# Patient Record
Sex: MALE | Employment: UNEMPLOYED | URBAN - METROPOLITAN AREA
[De-identification: names, ages, dates, MRNs, and addresses within clinical notes are randomized per-mention and may not be internally consistent; named-entity substitution may affect disease eponyms.]

---

## 2020-05-02 ENCOUNTER — OFFICE VISIT (OUTPATIENT)
Dept: URGENT CARE | Facility: CLINIC | Age: 30
End: 2020-05-02

## 2020-05-02 VITALS
WEIGHT: 200 LBS | BODY MASS INDEX: 30.31 KG/M2 | HEIGHT: 68 IN | OXYGEN SATURATION: 97 % | HEART RATE: 94 BPM | TEMPERATURE: 99 F

## 2020-05-02 DIAGNOSIS — H57.12 PAIN OF LEFT EYE: Primary | ICD-10-CM

## 2020-05-02 PROCEDURE — 99203 OFFICE O/P NEW LOW 30 MIN: CPT | Mod: TIER,S$GLB,, | Performed by: PHYSICIAN ASSISTANT

## 2020-05-02 PROCEDURE — 99203 PR OFFICE/OUTPT VISIT, NEW, LEVL III, 30-44 MIN: ICD-10-PCS | Mod: TIER,S$GLB,, | Performed by: PHYSICIAN ASSISTANT

## 2020-05-02 RX ORDER — GENTAMICIN SULFATE 3 MG/ML
2 SOLUTION/ DROPS OPHTHALMIC EVERY 4 HOURS
Qty: 15 ML | Refills: 0 | Status: SHIPPED | OUTPATIENT
Start: 2020-05-02 | End: 2020-05-09

## 2020-05-02 NOTE — PATIENT INSTRUCTIONS
· Follow up with your primary care if symptoms do not improve, or you may return here at any time.  · If you were referred to a specialist, please follow up with that specialty.  · If you were prescribed antibiotics, please take them to completion.  · If you were prescribed a narcotic or any medication with sedative effects, do not drive or operate heavy equipment or machinery while taking these medications.  · You must understand that you have received treatment at an Urgent Care facility only, and that you may be released before all of your medical problems are known or treated. Urgent Care facilities are not equipped to handle life threatening emergencies. It is recommended that you seek care at an Emergency Department for further evaluation of worsening or concerning symptoms, or possibly life threatening conditions as discussed.                                        If you  smoke, please stop smoking               PLEASE PRACTICE SOCIAL DISTANCING            Lesión de la córnea    La córnea es la capa transparente en la parte frontal del xavi. Protege el xavi contra el polvo y los gérmenes, y ayuda a filtrar los titi ultravioletas (UV) dañinos. La córnea también ayuda a enfocar la jenny que entra a valles xavi. Aunque la córnea está hecha de proteínas wayne, puede lastimarse. Un gena superficial o leigh raspadura (abrasión) suelen ser menores, isac igualmente pueden ser muy dolorosos. Isac leigh abrasión natasha o leigh punción en la córnea pueden ser cosas muy graves y constituyen emergencias médicas.  Algo en el xavi  Si azeem que tiene algo pequeño en el xavi, enjuáguelo con agua de inmediato. Levante el párpado superior y páselo sobre el párpado inferior para ayudar a incrementar el flujo de lágrimas en el xavi. Si estos métodos no funcionan, llame a valles médico. Nunca intente quitarse un objeto de un xavi si no ha salido fácilmente al enjuagarse el xavi con agua, ya que al hacerlo puede causar más daño.  ¿Cuándo debe acudir  a la jennifer de emergencias (ER, por ernesto siglas en inglés)?  Llame al 911 o al número local de emergencias si tiene:  · Dolor natasha en el xavi  · Leigh lesión provocada por punción o leigh abrasión natasha  · Leigh partícula en el xavi que no sale al enjuagar con agua  · El xavi muy hinchado o adolorido después de gregorio quitado un objeto  · Leigh quemadura química  · Un objeto que se introdujo en valles xavi (Cúbrase ambos ojos con leigh compresa estéril y mantenga ambos ojos cerrados mientras espera que llegue la ayuda. NO EJERZA ningún tipo de presión sobre los ojos).  ¿Qué puede esperar en la ER?  En el roberto carlos de las abrasiones menores   Las abrasiones menores suelen tratarse con gotas o pomada para los ojos. Es posible que le receten antibióticos para evitar leigh infección. La mayoría de las abrasiones sanan en balta o dos días. Para ayudar a descartar lesiones más graves, es posible que le realicen balta o más de los siguientes exámenes:  · Un examen estándar de los ojos para fredy cómo está valles visión.  · Un examen con lámpara de hendidura, que usa un dayanna de jenny y un microscopio para fredy el xavi en detalle.  · Leigh prueba de Bal, que usa leigh tintura especial para fredy si hay daños graves en el xavi.  Según cuáles yasmin los resultados de estas pruebas, puede que le remitan a un especialista de los ojos (oftalmólogo).  En el roberto carlos de abrasiones o punciones graves   Le remitirán directamente a un oftalmólogo para tratamiento de emergencia. Se necesita un especialista de ojos para minimizar todo daño adicional a los ojos y cualquier posible pérdida de visión.  Date Last Reviewed: 8/12/2015  © 2170-9604 [employer]. [address] [place] [address], [place] [address]. Todos los derechos reservados. Esta información no pretende sustituir la atención médica profesional. Sólo valles médico puede diagnosticar y tratar un problema de jared.

## 2020-05-02 NOTE — PROGRESS NOTES
"Subjective:       Patient ID: Abhilash Juárez is a 30 y.o. male.    Vitals:  height is 5' 8" (1.727 m) and weight is 90.7 kg (200 lb). His oral temperature is 99.2 °F (37.3 °C). His pulse is 94. His oxygen saturation is 97%.     Chief Complaint: Eye Problem    Eye Problem    The left eye is affected. This is a new problem. The current episode started yesterday. The problem occurs intermittently. The problem has been unchanged. The injury mechanism was a foreign body. The pain is at a severity of 5/10. The pain is mild. There is no known exposure to pink eye. He does not wear contacts. Associated symptoms include eye redness and itching. Pertinent negatives include no blurred vision, eye discharge, double vision, fever, nausea, photophobia or vomiting. 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 Pt. States he has a burning feeling in his left eye.. He has tried commercial eye wash for the symptoms. The treatment provided no relief.       Constitution: Negative for chills and fever.   HENT: Negative for congestion and sinus pain.    Eyes: Positive for foreign body in eye, eye itching, eye pain and eye redness. Negative for eye trauma, eye discharge, photophobia, vision loss, double vision, blurred vision and eyelid swelling.   Gastrointestinal: Negative for nausea and vomiting.   Skin: Negative for rash.   Allergic/Immunologic: Negative for seasonal allergies and itching.   Neurological: Negative for headaches.       Objective:      Physical Exam   Constitutional: He is oriented to person, place, and time. He appears well-developed and well-nourished.   HENT:   Head: Normocephalic and atraumatic.   Right Ear: External ear normal.   Left Ear: External ear normal.   Nose: Nose normal.   Mouth/Throat: Oropharynx is clear and moist.   Eyes: Pupils are equal, round, and reactive to light. EOM and lids are normal. Left eye exhibits no discharge. " Left conjunctiva is injected. Left conjunctiva has no hemorrhage.   Visual acuity 20/20 in both eyes and each eye individually    No pupillary defect on penlight exam.    Verbal consent obtained prior to procedure.  Proparacaine applied to L eye.  No foreign body with lid sweep.  Fluorescein staining revealed no corneal abrasion or injury.  No evidence of penetrating injury.   No rust ring.  Pt tolerated procedure well.     Neck: Trachea normal, full passive range of motion without pain and phonation normal. Neck supple.   Musculoskeletal: Normal range of motion.   Neurological: He is alert and oriented to person, place, and time.   Skin: Skin is warm, dry and intact.   Psychiatric: He has a normal mood and affect. His speech is normal and behavior is normal. Judgment and thought content normal. Cognition and memory are normal.   Nursing note and vitals reviewed.        Assessment:       1. Pain of left eye        Plan:       All hx was provided by the pt or available as part of established EMR. The pt past medical hx, family hx, social hx, and current medications were reviewed. Pt to follow up with [facility] if no improvement or for any concern, and seek treatment in an ER for worsening. Tx options discussed. Pt voiced understanding of all discussed and agreed with decision making.    Pain of left eye  -     gentamicin (GARAMYCIN) 0.3 % ophthalmic solution; Place 2 drops into both eyes every 4 (four) hours. for 7 days  Dispense: 15 mL; Refill: 0      Patient Instructions   · Follow up with your primary care if symptoms do not improve, or you may return here at any time.  · If you were referred to a specialist, please follow up with that specialty.  · If you were prescribed antibiotics, please take them to completion.  · If you were prescribed a narcotic or any medication with sedative effects, do not drive or operate heavy equipment or machinery while taking these medications.  · You must understand that you have  received treatment at an Urgent Care facility only, and that you may be released before all of your medical problems are known or treated. Urgent Care facilities are not equipped to handle life threatening emergencies. It is recommended that you seek care at an Emergency Department for further evaluation of worsening or concerning symptoms, or possibly life threatening conditions as discussed.                                        If you  smoke, please stop smoking               PLEASE PRACTICE SOCIAL DISTANCING            Lesión de la córnea    La córnea es la capa transparente en la parte frontal del xavi. Protege el xavi contra el polvo y los gérmenes, y ayuda a filtrar los titi ultravioletas (UV) dañinos. La córnea también ayuda a enfocar la jenny que entra a valles xavi. Aunque la córnea está hecha de proteínas wayne, puede lastimarse. Un gena superficial o leigh raspadura (abrasión) suelen ser menores, isac igualmente pueden ser muy dolorosos. Isac leigh abrasión natasha o leigh punción en la córnea pueden ser cosas muy graves y constituyen emergencias médicas.  Algo en el xavi  Si azeem que tiene algo pequeño en el xavi, enjuáguelo con agua de inmediato. Levante el párpado superior y páselo sobre el párpado inferior para ayudar a incrementar el flujo de lágrimas en el xavi. Si estos métodos no funcionan, llame a valles médico. Nunca intente quitarse un objeto de un xavi si no ha salido fácilmente al enjuagarse el xavi con agua, ya que al hacerlo puede causar más daño.  ¿Cuándo debe acudir a la jennifer de emergencias (ER, por ernesto siglas en inglés)?  Llame al 911 o al número local de emergencias si tiene:  · Dolor natasha en el xavi  · Leigh lesión provocada por punción o leigh abrasión natasha  · Leigh partícula en el xavi que no sale al enjuagar con agua  · El xavi muy hinchado o adolorido después de gregorio quitado un objeto  · Leigh quemadura química  · Un objeto que se introdujo en valles xavi (Cúbrase ambos ojos con leigh compresa estéril y  mantenga ambos ojos cerrados mientras espera que llegue la ayuda. NO EJERZA ningún tipo de presión sobre los ojos).  ¿Qué puede esperar en la ER?  En el roberto carlos de las abrasiones menores   Las abrasiones menores suelen tratarse con gotas o pomada para los ojos. Es posible que le receten antibióticos para evitar janet infección. La mayoría de las abrasiones sanan en balta o dos días. Para ayudar a descartar lesiones más graves, es posible que le realicen balta o más de los siguientes exámenes:  · Un examen estándar de los ojos para fredy cómo está valles visión.  · Un examen con lámpara de hendidura, que usa un dayanna de jenny y un microscopio para fredy el xavi en detalle.  · Janet prueba de Bal, que usa janet tintura especial para fredy si hay daños graves en el xavi.  Según cuáles yasmin los resultados de estas pruebas, puede que le remitan a un especialista de los ojos (oftalmólogo).  En el roberto carlos de abrasiones o punciones graves   Le remitirán directamente a un oftalmólogo para tratamiento de emergencia. Se necesita un especialista de ojos para minimizar todo daño adicional a los ojos y cualquier posible pérdida de visión.  Date Last Reviewed: 8/12/2015  © 2300-0683 [employer]. [address] [place] [address], [place] [address]. Todos los derechos reservados. Esta información no pretende sustituir la atención médica profesional. Sólo vlales médico puede diagnosticar y tratar un problema de jared.